# Patient Record
Sex: FEMALE | Race: WHITE | NOT HISPANIC OR LATINO | URBAN - METROPOLITAN AREA
[De-identification: names, ages, dates, MRNs, and addresses within clinical notes are randomized per-mention and may not be internally consistent; named-entity substitution may affect disease eponyms.]

---

## 2020-11-13 ENCOUNTER — EMERGENCY (EMERGENCY)
Facility: HOSPITAL | Age: 75
LOS: 1 days | Discharge: HOME | End: 2020-11-13
Attending: EMERGENCY MEDICINE | Admitting: EMERGENCY MEDICINE
Payer: MEDICARE

## 2020-11-13 VITALS
RESPIRATION RATE: 18 BRPM | HEART RATE: 88 BPM | OXYGEN SATURATION: 98 % | DIASTOLIC BLOOD PRESSURE: 87 MMHG | SYSTOLIC BLOOD PRESSURE: 127 MMHG

## 2020-11-13 VITALS
SYSTOLIC BLOOD PRESSURE: 91 MMHG | RESPIRATION RATE: 24 BRPM | DIASTOLIC BLOOD PRESSURE: 62 MMHG | TEMPERATURE: 98 F | HEIGHT: 65 IN | WEIGHT: 169.98 LBS | OXYGEN SATURATION: 96 % | HEART RATE: 102 BPM

## 2020-11-13 DIAGNOSIS — R19.7 DIARRHEA, UNSPECIFIED: ICD-10-CM

## 2020-11-13 DIAGNOSIS — Z88.0 ALLERGY STATUS TO PENICILLIN: ICD-10-CM

## 2020-11-13 DIAGNOSIS — R11.10 VOMITING, UNSPECIFIED: ICD-10-CM

## 2020-11-13 DIAGNOSIS — R05 COUGH: ICD-10-CM

## 2020-11-13 LAB
ALBUMIN SERPL ELPH-MCNC: 4.2 G/DL — SIGNIFICANT CHANGE UP (ref 3.3–5)
ALP SERPL-CCNC: 72 U/L — SIGNIFICANT CHANGE UP (ref 40–120)
ALT FLD-CCNC: 32 U/L — SIGNIFICANT CHANGE UP (ref 10–45)
ANION GAP SERPL CALC-SCNC: 14 MMOL/L — SIGNIFICANT CHANGE UP (ref 5–17)
APTT BLD: 25.8 SEC — LOW (ref 27.5–35.5)
AST SERPL-CCNC: 25 U/L — SIGNIFICANT CHANGE UP (ref 10–40)
BASOPHILS # BLD AUTO: 0.01 K/UL — SIGNIFICANT CHANGE UP (ref 0–0.2)
BASOPHILS NFR BLD AUTO: 0.2 % — SIGNIFICANT CHANGE UP (ref 0–2)
BILIRUB SERPL-MCNC: 0.5 MG/DL — SIGNIFICANT CHANGE UP (ref 0.2–1.2)
BLD GP AB SCN SERPL QL: NEGATIVE — SIGNIFICANT CHANGE UP
BUN SERPL-MCNC: 11 MG/DL — SIGNIFICANT CHANGE UP (ref 7–23)
CALCIUM SERPL-MCNC: 9.5 MG/DL — SIGNIFICANT CHANGE UP (ref 8.4–10.5)
CHLORIDE SERPL-SCNC: 106 MMOL/L — SIGNIFICANT CHANGE UP (ref 96–108)
CK MB CFR SERPL CALC: 4.8 NG/ML — SIGNIFICANT CHANGE UP (ref 0–6.7)
CK SERPL-CCNC: 199 U/L — HIGH (ref 25–170)
CO2 SERPL-SCNC: 25 MMOL/L — SIGNIFICANT CHANGE UP (ref 22–31)
CREAT SERPL-MCNC: 0.8 MG/DL — SIGNIFICANT CHANGE UP (ref 0.5–1.3)
EOSINOPHIL # BLD AUTO: 0.09 K/UL — SIGNIFICANT CHANGE UP (ref 0–0.5)
EOSINOPHIL NFR BLD AUTO: 1.4 % — SIGNIFICANT CHANGE UP (ref 0–6)
GLUCOSE SERPL-MCNC: 128 MG/DL — HIGH (ref 70–99)
HCT VFR BLD CALC: 42.8 % — SIGNIFICANT CHANGE UP (ref 34.5–45)
HGB BLD-MCNC: 14.2 G/DL — SIGNIFICANT CHANGE UP (ref 11.5–15.5)
IMM GRANULOCYTES NFR BLD AUTO: 0.3 % — SIGNIFICANT CHANGE UP (ref 0–1.5)
INR BLD: 1.08 — SIGNIFICANT CHANGE UP (ref 0.88–1.16)
LACTATE SERPL-SCNC: 0.9 MMOL/L — SIGNIFICANT CHANGE UP (ref 0.5–2)
LYMPHOCYTES # BLD AUTO: 1.27 K/UL — SIGNIFICANT CHANGE UP (ref 1–3.3)
LYMPHOCYTES # BLD AUTO: 20.5 % — SIGNIFICANT CHANGE UP (ref 13–44)
MCHC RBC-ENTMCNC: 30.9 PG — SIGNIFICANT CHANGE UP (ref 27–34)
MCHC RBC-ENTMCNC: 33.2 GM/DL — SIGNIFICANT CHANGE UP (ref 32–36)
MCV RBC AUTO: 93 FL — SIGNIFICANT CHANGE UP (ref 80–100)
MONOCYTES # BLD AUTO: 0.47 K/UL — SIGNIFICANT CHANGE UP (ref 0–0.9)
MONOCYTES NFR BLD AUTO: 7.6 % — SIGNIFICANT CHANGE UP (ref 2–14)
NEUTROPHILS # BLD AUTO: 4.35 K/UL — SIGNIFICANT CHANGE UP (ref 1.8–7.4)
NEUTROPHILS NFR BLD AUTO: 70 % — SIGNIFICANT CHANGE UP (ref 43–77)
NRBC # BLD: 0 /100 WBCS — SIGNIFICANT CHANGE UP (ref 0–0)
PLATELET # BLD AUTO: 306 K/UL — SIGNIFICANT CHANGE UP (ref 150–400)
POTASSIUM SERPL-MCNC: 3.7 MMOL/L — SIGNIFICANT CHANGE UP (ref 3.5–5.3)
POTASSIUM SERPL-SCNC: 3.7 MMOL/L — SIGNIFICANT CHANGE UP (ref 3.5–5.3)
PROT SERPL-MCNC: 7.4 G/DL — SIGNIFICANT CHANGE UP (ref 6–8.3)
PROTHROM AB SERPL-ACNC: 12.9 SEC — SIGNIFICANT CHANGE UP (ref 10.6–13.6)
RBC # BLD: 4.6 M/UL — SIGNIFICANT CHANGE UP (ref 3.8–5.2)
RBC # FLD: 12.3 % — SIGNIFICANT CHANGE UP (ref 10.3–14.5)
RH IG SCN BLD-IMP: POSITIVE — SIGNIFICANT CHANGE UP
SODIUM SERPL-SCNC: 145 MMOL/L — SIGNIFICANT CHANGE UP (ref 135–145)
TROPONIN T SERPL-MCNC: <0.01 NG/ML — SIGNIFICANT CHANGE UP (ref 0–0.01)
WBC # BLD: 6.21 K/UL — SIGNIFICANT CHANGE UP (ref 3.8–10.5)
WBC # FLD AUTO: 6.21 K/UL — SIGNIFICANT CHANGE UP (ref 3.8–10.5)

## 2020-11-13 PROCEDURE — 99284 EMERGENCY DEPT VISIT MOD MDM: CPT | Mod: 25

## 2020-11-13 PROCEDURE — 71045 X-RAY EXAM CHEST 1 VIEW: CPT | Mod: 26,77

## 2020-11-13 PROCEDURE — 85610 PROTHROMBIN TIME: CPT

## 2020-11-13 PROCEDURE — 86850 RBC ANTIBODY SCREEN: CPT

## 2020-11-13 PROCEDURE — 87040 BLOOD CULTURE FOR BACTERIA: CPT

## 2020-11-13 PROCEDURE — 85730 THROMBOPLASTIN TIME PARTIAL: CPT

## 2020-11-13 PROCEDURE — 36415 COLL VENOUS BLD VENIPUNCTURE: CPT

## 2020-11-13 PROCEDURE — 85025 COMPLETE CBC W/AUTO DIFF WBC: CPT

## 2020-11-13 PROCEDURE — 84484 ASSAY OF TROPONIN QUANT: CPT

## 2020-11-13 PROCEDURE — 82553 CREATINE MB FRACTION: CPT

## 2020-11-13 PROCEDURE — 83605 ASSAY OF LACTIC ACID: CPT

## 2020-11-13 PROCEDURE — 93010 ELECTROCARDIOGRAM REPORT: CPT

## 2020-11-13 PROCEDURE — 82550 ASSAY OF CK (CPK): CPT

## 2020-11-13 PROCEDURE — 80053 COMPREHEN METABOLIC PANEL: CPT

## 2020-11-13 PROCEDURE — 99283 EMERGENCY DEPT VISIT LOW MDM: CPT

## 2020-11-13 PROCEDURE — 86901 BLOOD TYPING SEROLOGIC RH(D): CPT

## 2020-11-13 PROCEDURE — 71045 X-RAY EXAM CHEST 1 VIEW: CPT

## 2020-11-13 PROCEDURE — 93005 ELECTROCARDIOGRAM TRACING: CPT

## 2020-11-13 PROCEDURE — 71045 X-RAY EXAM CHEST 1 VIEW: CPT | Mod: 26

## 2020-11-13 PROCEDURE — 86900 BLOOD TYPING SEROLOGIC ABO: CPT

## 2020-11-13 NOTE — ED ADULT NURSE NOTE - NSIMPLEMENTINTERV_GEN_ALL_ED
Implemented All Universal Safety Interventions:  [place] to call system. Call bell, personal items and telephone within reach. Instruct patient to call for assistance. Room bathroom lighting operational. Non-slip footwear when patient is off stretcher. Physically safe environment: no spills, clutter or unnecessary equipment. Stretcher in lowest position, wheels locked, appropriate side rails in place.

## 2020-11-13 NOTE — ED PROVIDER NOTE - OBJECTIVE STATEMENT
Patient is a 75 year old female with PMH of HLD, depression, and anxiety who had a routine EGD/colonoscopy today and had an episode of vomiting during the procedure. Pt was coughing after and sent to the ED to r/o aspiration. Currently tpt is feeling anxious and has a mild non productive cough.  She denies chest pain, SOB, fever or chills, abdominal pain, and nausea.  She notes that she was in her usual state of health prior to today. Pt is a 75 year old female with PMH of HLD, depression, and anxiety who had a routine EGD/colonoscopy today and had an episode of vomiting during colonoscopy. EGD aborted. Pt was coughing afterwards and sent to the ED to r/o aspiration. Currently pt is feeling anxious and has a mild non productive cough. She denies chest pain, SOB, fever or chills, abdominal pain, and nausea. She notes that she was in her usual state of health prior to today.

## 2020-11-13 NOTE — ED PROVIDER NOTE - PATIENT PORTAL LINK FT
You can access the FollowMyHealth Patient Portal offered by [facility] by registering at the following website: [email or web address]. By joining [employer]’s FollowMyHealth portal, you will also be able to view your health information using other applications (apps) compatible with our system.

## 2020-11-13 NOTE — ED PROVIDER NOTE - PHYSICAL EXAMINATION
VITAL SIGNS: I have reviewed nursing notes and confirm.  CONSTITUTIONAL: Well-developed; well-nourished; in no acute distress.   SKIN:  warm and dry, no acute rash.   HEAD:  normocephalic, atraumatic.  EYES: EOM intact; conjunctiva and sclera clear.  ENT: No nasal discharge; airway clear.   NECK: Supple; non tender.  CARD: S1, S2 normal; no murmurs, gallops, or rubs. Regular rate and rhythm.   RESP:  Clear to auscultation b/l, no wheezes, rales or rhonchi.  ABD: Normal bowel sounds; soft; non-distended; non-tender; no guarding/ rebound.  EXT: Normal ROM. No clubbing, cyanosis or edema. 2+ pulses to b/l ue/le.  NEURO: Alert, oriented, grossly unremarkable  PSYCH: anxious appearing.

## 2020-11-13 NOTE — ED PROVIDER NOTE - CARE PROVIDER_API CALL
Bg Reaves  GASTROENTEROLOGY  [address] [place] [address], [place] [id number]  Phone: [phone]  Fax: [phone]  Follow Up Time:

## 2020-11-13 NOTE — ED PROVIDER NOTE - DIAGNOSTIC INTERPRETATION
ER Physician: June Ree  CHEST XRAY INTERPRETATION: costophrenic angles missed on cxr, lungs otherwise clear, heart shadow normal, bony structures intact

## 2020-11-13 NOTE — ED ADULT NURSE NOTE - NSSUHOSCREENINGYN_ED_ALL_ED
Pt rec'd standing in her room with right knee dressing CDI.
Yes - the patient is able to be screened

## 2020-11-13 NOTE — ED PROVIDER NOTE - NSFOLLOWUPINSTRUCTIONS_ED_ALL_ED_FT
Take clindamycin every 8 hours for the next 5 days.  Follow up with Dr. Reaves for re-evaluation.   Return to er for any new or worsening symptoms (fever, chills, shortness of breath, worsening cough, chest pain, abdominal pain...)    Cough, Adult      Coughing is a reflex that clears your throat and your airways (respiratory system). Coughing helps to heal and protect your lungs. It is normal to cough occasionally, but a cough that happens with other symptoms or lasts a long time may be a sign of a condition that needs treatment. An acute cough may only last 2–3 weeks, while a chronic cough may last 8 or more weeks.  Coughing is commonly caused by:  •Infection of the respiratory systemby viruses or bacteria.      •Breathing in substances that irritate your lungs.      •Allergies.      •Asthma.      •Mucus that runs down the back of your throat (postnasal drip).      •Smoking.      •Acid backing up from the stomach into the esophagus (gastroesophageal reflux).      •Certain medicines.      •Chronic lung problems.      •Other medical conditions such as heart failure or a blood clot in the lung (pulmonary embolism).        Follow these instructions at home:    Medicines     •Take over-the-counter and prescription medicines only as told by your health care provider.      •Talk with your health care provider before you take a cough suppressant medicine.        Lifestyle      •Avoid cigarette smoke. Do not use any products that contain nicotine or tobacco, such as cigarettes, e-cigarettes, and chewing tobacco. If you need help quitting, ask your health care provider.      •Drink enough fluid to keep your urine pale yellow.      •Avoid caffeine.      • Do not drink alcohol if your health care provider tells you not to drink.        General instructions      •Pay close attention to changes in your cough. Tell your health care provider about them.      •Always cover your mouth when you cough.      •Avoid things that make you cough, such as perfume, candles, cleaning products, or campfire or tobacco smoke.      •If the air is dry, use a cool mist vaporizer or humidifier in your bedroom or your home to help loosen secretions.      •If your cough is worse at night, try to sleep in a semi-upright position.      •Rest as needed.      •Keep all follow-up visits as told by your health care provider. This is important.        Contact a health care provider if you:    •Have new symptoms.      •Cough up pus.      •Have a cough that does not get better after 2–3 weeks or gets worse.      •Cannot control your cough with cough suppressant medicines and you are losing sleep.      •Have pain that gets worse or pain that is not helped with medicine.      •Have a fever.      •Have unexplained weight loss.      •Have night sweats.        Get help right away if:    •You cough up blood.      •You have difficulty breathing.      •Your heartbeat is very fast.      These symptoms may represent a serious problem that is an emergency. Do not wait to see if the symptoms will go away. Get medical help right away. Call your local emergency services (911 in the [place] Do not drive yourself to the hospital.       Summary    •Coughing is a reflex that clears your throat and your airways. It is normal to cough occasionally, but a cough that happens with other symptoms or lasts a long time may be a sign of a condition that needs treatment.      •Take over-the-counter and prescription medicines only as told by your health care provider.      •Always cover your mouth when you cough.      •Contact a health care provider if you have new symptoms or a cough that does not get better after 2–3 weeks or gets worse.      This information is not intended to replace advice given to you by your health care provider. Make sure you discuss any questions you have with your health care provider.

## 2020-11-13 NOTE — ED ADULT NURSE NOTE - CHIEF COMPLAINT QUOTE
Patient referred to ER from outpatient procedure. Per EMS, patient sent to r/o aspiration, patient vomited during endoscopy.

## 2020-11-13 NOTE — ED ADULT NURSE NOTE - OBJECTIVE STATEMENT
Pt reports endoscopy and colonoscopy today, was given propofol and IV started and pt vomited and possibly aspirated. Pt has cough on arrival, reports cough after attempting procedure, pt denies chest pain, sob, dizziness, nausea.

## 2020-11-13 NOTE — ED ADULT TRIAGE NOTE - CHIEF COMPLAINT QUOTE
Patient referred to ER from outpatient procedure. Per EMS, patient sent to r/o aspiration, patient vomited during endoscopy.
Patient refused

## 2020-11-13 NOTE — ED PROVIDER NOTE - CLINICAL SUMMARY MEDICAL DECISION MAKING FREE TEXT BOX
Impression: routine EGD/colonoscopy today with episode of nausea and vomiting and sent to ED to r/o aspiration. Pt is afebrile and hemodynamically stable with O2 sat 94%-95%. Will look at chest x-ray and labs, and contact Dr. Reaves. Impression: s/p routine colonoscopy today with episode of nausea and vomiting while undergoing procedure. Pt sent to ED to r/o aspiration. Pt is afebrile and hemodynamically stable with O2 sat 94%-95%. Labs reviewed and reassuring w/ normal wbc/ lactate. Unable to visualize end of costophrenic angles b/l however no obvious infiltrate. EKG non-ischemic. ED evaluation and management discussed with the patient, family and Dr. Reaves in detail.  Close PMD follow up encouraged.  Will send home w/ rx for abx x 5 days as per Dr. Reaves. Strict ED return instructions discussed in detail and patient given the opportunity to ask any questions about their discharge diagnosis and instructions. Patient verbalized understanding.

## 2020-11-18 PROBLEM — F41.8 OTHER SPECIFIED ANXIETY DISORDERS: Chronic | Status: ACTIVE | Noted: 2020-11-13

## 2020-11-18 PROBLEM — E78.5 HYPERLIPIDEMIA, UNSPECIFIED: Chronic | Status: ACTIVE | Noted: 2020-11-13

## 2020-11-18 LAB
CULTURE RESULTS: SIGNIFICANT CHANGE UP
CULTURE RESULTS: SIGNIFICANT CHANGE UP
SPECIMEN SOURCE: SIGNIFICANT CHANGE UP
SPECIMEN SOURCE: SIGNIFICANT CHANGE UP

## 2021-02-23 PROBLEM — Z00.00 ENCOUNTER FOR PREVENTIVE HEALTH EXAMINATION: Status: ACTIVE | Noted: 2021-02-23

## 2021-03-05 ENCOUNTER — APPOINTMENT (OUTPATIENT)
Age: 76
End: 2021-03-05

## 2021-10-21 ENCOUNTER — APPOINTMENT (OUTPATIENT)
Dept: ORTHOPEDIC SURGERY | Facility: CLINIC | Age: 76
End: 2021-10-21
Payer: MEDICARE

## 2021-10-21 VITALS — BODY MASS INDEX: 27.32 KG/M2 | WEIGHT: 170 LBS | HEIGHT: 66 IN

## 2021-10-21 DIAGNOSIS — Z78.9 OTHER SPECIFIED HEALTH STATUS: ICD-10-CM

## 2021-10-21 DIAGNOSIS — M25.562 PAIN IN RIGHT KNEE: ICD-10-CM

## 2021-10-21 DIAGNOSIS — F12.90 CANNABIS USE, UNSPECIFIED, UNCOMPLICATED: ICD-10-CM

## 2021-10-21 DIAGNOSIS — Z60.2 PROBLEMS RELATED TO LIVING ALONE: ICD-10-CM

## 2021-10-21 DIAGNOSIS — S83.207A UNSPECIFIED TEAR OF UNSPECIFIED MENISCUS, CURRENT INJURY, LEFT KNEE, INITIAL ENCOUNTER: ICD-10-CM

## 2021-10-21 DIAGNOSIS — M25.561 PAIN IN RIGHT KNEE: ICD-10-CM

## 2021-10-21 DIAGNOSIS — Z82.61 FAMILY HISTORY OF ARTHRITIS: ICD-10-CM

## 2021-10-21 DIAGNOSIS — S83.241A OTHER TEAR OF MEDIAL MENISCUS, CURRENT INJURY, RIGHT KNEE, INITIAL ENCOUNTER: ICD-10-CM

## 2021-10-21 PROCEDURE — 20610 DRAIN/INJ JOINT/BURSA W/O US: CPT | Mod: 50

## 2021-10-21 PROCEDURE — 73564 X-RAY EXAM KNEE 4 OR MORE: CPT | Mod: 50

## 2021-10-21 PROCEDURE — 99204 OFFICE O/P NEW MOD 45 MIN: CPT | Mod: 25

## 2021-10-21 RX ORDER — ATORVASTATIN CALCIUM 20 MG/1
20 TABLET, FILM COATED ORAL
Refills: 0 | Status: ACTIVE | COMMUNITY

## 2021-10-21 RX ORDER — METFORMIN HYDROCHLORIDE 500 MG/1
500 TABLET, COATED ORAL
Refills: 0 | Status: ACTIVE | COMMUNITY

## 2021-10-21 SDOH — SOCIAL STABILITY - SOCIAL INSECURITY: PROBLEMS RELATED TO LIVING ALONE: Z60.2

## 2021-10-21 NOTE — DISCUSSION/SUMMARY
[de-identified] : 76 year old retired female active with gym and Pilates with recent fall and likely exacerbation of mild DJD versus degenerative meniscus tear of right knee. However even prior to the fall she had significant right knee ache and occasionally swelling. Has previous MRI of left knee showing lateral meniscus anterior horn tear which remains minimally symptomatic. \par Plan \par cortisone injections today\par We had a long discussion today about the risks and benefits of various Orthobiologic injection procedures specifically in her case prp injections. \par We will consider doing those if symptoms persist in November first pending MRI evlauation. \par \par

## 2021-10-21 NOTE — PHYSICAL EXAM
[de-identified] : Patient is well nourished, well-developed, in no acute distress, with appropriate mood and affect. The patient is oriented to time, place, and person. Gait evaluation does not reveal a limp. The skin examination does not reveal obvious lesions, lacerations, or ecchymosis.\par \par Right knee: \par Inspection: No obvious deformities of the knees on inspection. No effusion in the right knee. Skin intact without overlying skin discoloration or ecchymosis. \par Palpation: Positive tenderness to palpation of the medial joint line. No tenderness to palpation of the lateral joint line. No erythema/warmth. \par ROM:  0-125 with crepitus. \par Strength:  Quads 4+/5. \par Special Testing: \par Patellofemoral Crepitus positive\par Lachman negative. Anterior drawer test negative. Posterior drawer test negative \par Valgus stress testing at 0 and 30 degrees negative. Varus stress testing at 0 and 30 degrees negative \par Dennise’s test negative. Rocky test negative  \par \par Left knee: \par Inspection: No obvious deformities of the knees on inspection. No effusion in the left knee. Skin intact without overlying skin discoloration or ecchymosis. \par Palpation: Tenderness to palpation of the medial and lateral joint line. No erythema/warmth. \par ROM:  0-130 with crepitus. \par Strength: Quads 5/5. \par Special Testing: \par Patellofemoral Crepitus negative \par Lachman negative. Anterior drawer test negative. Posterior drawer test negative \par Valgus stress testing at 0 and 30 degrees negative. Varus stress testing at 0 and 30 degrees negative \par Dennise’s test positive. Rocky test negative  \par \par Sensation is intact to light touch L2-S1 bilaterally. \par DP/PT are 2+ with no swelling bilaterally.\par  [de-identified] : Standard 4-view radiographs of the bilateral knees obtained today were reviewed today. Show No fracture or dislocation or acute changes . KL 2 . No major joint space narrowing N\par \par \par

## 2021-10-21 NOTE — HISTORY OF PRESENT ILLNESS
[de-identified] : 76 year old female presents today for initial visit for bilateral right greater than left knee pain. Patient states that her lateral knee pain beginning with her left in the summer with chronic hamstring pain after a fall 6 years ago. 2 weeks ago she took a fall which has impacted her right knee, giving her medial anterior pain with joint swelling. She's followed her care in [facility] to this point with physical therapy which she states hasn’t been helpful. Denies locking or instability. Has previous MRI of left knee showing degenerative lateral meniscus tear.

## 2021-11-16 ENCOUNTER — APPOINTMENT (OUTPATIENT)
Dept: ORTHOPEDIC SURGERY | Facility: CLINIC | Age: 76
End: 2021-11-16

## 2021-11-17 NOTE — PROCEDURE
[de-identified] : The right knee was prepped with alchohol and ethyl chloride was used to numb the skin. 1.5 cc of xylocaine 1% was placed for local anaesthesia. An injection durolane (60 mg/3 ml) was then given without complication into the  joint. Patient instructed that there may be an inflammatory flare for 24 hrs , to use ice or advil if needed. \par \par Lot number: [id number]\par Exp: 3-\par \par The left knee was prepped with alchohol and ethyl chloride was used to numb the skin. 1.5 cc of xylocaine 1% was placed for local anaesthesia. An injection durolane (60 mg/3 ml) was then given without complication into the  joint. Patient instructed that there may be an inflammatory flare for 24 hrs , to use ice or advil if needed. \par \par Lot number: [id number]\par Exp: 3-\par \par \par Plan:\par - f/u in 4 weeks for tele-visit

## 2021-11-19 RX ORDER — MELOXICAM 15 MG/1
15 TABLET ORAL DAILY
Qty: 30 | Refills: 2 | Status: COMPLETED | COMMUNITY
Start: 2021-11-19 | End: 2022-02-17

## 2021-11-24 ENCOUNTER — APPOINTMENT (OUTPATIENT)
Dept: ORTHOPEDIC SURGERY | Facility: CLINIC | Age: 76
End: 2021-11-24
Payer: MEDICARE

## 2021-11-24 DIAGNOSIS — S76.311A STRAIN OF MUSCLE, FASCIA AND TENDON OF THE POSTERIOR MUSCLE GROUP AT THIGH LEVEL, RIGHT THIGH, INITIAL ENCOUNTER: ICD-10-CM

## 2021-11-24 PROCEDURE — 99213 OFFICE O/P EST LOW 20 MIN: CPT

## 2021-11-24 NOTE — DISCUSSION/SUMMARY
[de-identified] : 75 y/o year old retired female active with gym and Pilates with bilateral meniscus tears and mild DJD with new right hamstring strain vs. L5-S1 radiculopathy. I see no evidence that she had any adverse reaction to her Durolane injection on 11/16/21 and she is now completely resolved and pain free. Her exam is now however benign. 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 Her MRI of her right knee from [facility] on 10/25/21 shows posterior horn tear of medial meniscus with root tear  and complex tearing of the lateral meniscus, although her right knee is currently pain free with no joint tenderness. I don't believe she needs surgical intervention at this time given her exam and excellent response so far to conservative care. \par \par Plan: \par - Continue PT while in [place]. Include posterior chain stretching and modalities PRN. \par - F/U in May when she returns form [place]

## 2021-11-24 NOTE — PHYSICAL EXAM
[de-identified] : Patient is well nourished, well-developed, in no acute distress, with appropriate mood and affect. The patient is oriented to time, place, and person. Gait evaluation does not reveal a limp. The skin examination does not reveal obvious lesions, lacerations, or ecchymosis.\par \par Right Knee: No effusion. No erythema or warmth. No joint tenderness. AROM 0-130 \par Right lower extremity: No ttp of hamstring or palpable defect. Positive hamstring and gastroc soleus tightness.  [de-identified] : MRI from 10/25/21 reviewed. Shows: \par 1. Contusion or stress reaction within the anterior central proximal tibia.\par 2.  Oblique undersurface tear of the posterior horn of the medial meniscus. Apical radial tear of the posterior root. Reactive marrow edema at the posterior root attachment.\par 3.  Complex tearing of the body and anterior horn of the lateral meniscus. The body is extruded.\par 4.  Mild medial, mild to moderate lateral, and mild to moderate patellofemoral compartment arthrosis.\par 5.  Findings that can be associated with patellar maltracking.\par 6.  Small knee joint effusion with mild to moderate synovitis decompressing into a moderate-sized leaking septated popliteal cyst. Mildly thickened suprapatellar and medial parapatellar plicae.\par 7.  Mild to moderate atrophy of the medial gastrocnemius muscle.

## 2021-11-24 NOTE — HISTORY OF PRESENT ILLNESS
[de-identified] : 77 y/o female presents today for follow up for right posterior thigh pain s/p her bilateral Durolane injection on 11/16/21. Patient reports that after her injection the next morning she experienced severe right posterior thigh pain, worse with sitting directly on her thigh. She called our office do to her severe pain and was ordered for Mobic which she took for 5 days and completely resolved her pain. She has stopped however as it bothered her stomach. She went to PT where they told her that her posterior chain muscles on her right side were in spasm and extremely tight. 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 She currently denies any thigh or knee pain. She is also here to review her MRI from [facility] on 10/25/21.

## 2021-12-16 ENCOUNTER — APPOINTMENT (OUTPATIENT)
Dept: ORTHOPEDIC SURGERY | Facility: CLINIC | Age: 76
End: 2021-12-16
Payer: MEDICARE

## 2021-12-16 DIAGNOSIS — S83.281D OTHER TEAR OF LATERAL MENISCUS, CURRENT INJURY, RIGHT KNEE, SUBSEQUENT ENCOUNTER: ICD-10-CM

## 2021-12-16 PROCEDURE — 99442: CPT | Mod: 95

## 2022-05-04 ENCOUNTER — APPOINTMENT (OUTPATIENT)
Dept: ORTHOPEDIC SURGERY | Facility: CLINIC | Age: 77
End: 2022-05-04
Payer: MEDICARE

## 2022-05-04 PROCEDURE — 99213 OFFICE O/P EST LOW 20 MIN: CPT

## 2022-05-04 NOTE — PHYSICAL EXAM
[de-identified] : Right knee, minimal lateral jt tenderness negative Dennise and guero neg lachman \par Quads 5/5

## 2022-05-04 NOTE — HISTORY OF PRESENT ILLNESS
[de-identified] : 76 y/o female presents today for follow up for bilateral knee pain. Left  Patient states she has been doing really well until about 3 weeks ago she started feeling pain lateral for the first time again. He hamstring issue was fully resolved with PT and her right knee remains painless.

## 2022-05-04 NOTE — DISCUSSION/SUMMARY
[de-identified] : PAtient did well Post HA injection 7 months ago and PT. Now with early minor lateral joint ache. recurring. \par Plan SHe would like to wait a week till after her Breast biopsy and pathology report./  SHe will return in 8 days for HA injection (Monovisc) \par

## 2022-06-15 ENCOUNTER — APPOINTMENT (OUTPATIENT)
Dept: ORTHOPEDIC SURGERY | Facility: CLINIC | Age: 77
End: 2022-06-15

## 2022-10-19 ENCOUNTER — APPOINTMENT (OUTPATIENT)
Dept: ORTHOPEDIC SURGERY | Facility: CLINIC | Age: 77
End: 2022-10-19

## 2023-04-19 ENCOUNTER — APPOINTMENT (OUTPATIENT)
Dept: ORTHOPEDIC SURGERY | Facility: CLINIC | Age: 78
End: 2023-04-19
Payer: MEDICARE

## 2023-04-19 PROCEDURE — 20610 DRAIN/INJ JOINT/BURSA W/O US: CPT | Mod: LT

## 2023-04-19 NOTE — PROCEDURE
[de-identified] : The right knee was prepped with alcohol and ethyl chloride was used to numb the skin. 1.5 cc of xylocaine 1% was placed for local anaesthesia. An injection durolane (60 mg/3 ml) was then given without complication into the  joint. Patient instructed that there may be an inflammatory flare for 24 hrs , to use ice or Advil if needed. \par \par Lot #[id number]\par Exp:2025-09-30\par \par The left knee was prepped with alcohol and ethyl chloride was used to numb the skin. 1.5 cc of xylocaine 1% was placed for local anaesthesia. An injection of durolane, was then given without complication into the  joint. Patient instructed that there may be an inflammatory flare for 24 hrs , to use ice or Advil if needed.\par \par Lot #[id number]\par Exp:2025-09-30\par \par Pt tolerated procedure well.\par F/u in 6 wks\par new PT given

## 2023-05-01 ENCOUNTER — NON-APPOINTMENT (OUTPATIENT)
Age: 78
End: 2023-05-01

## 2023-05-18 ENCOUNTER — APPOINTMENT (OUTPATIENT)
Dept: ORTHOPEDIC SURGERY | Facility: CLINIC | Age: 78
End: 2023-05-18
Payer: MEDICARE

## 2023-05-18 PROCEDURE — 99213 OFFICE O/P EST LOW 20 MIN: CPT

## 2023-05-18 RX ORDER — DICLOFENAC POTASSIUM 50 MG/1
50 TABLET, COATED ORAL TWICE DAILY
Qty: 60 | Refills: 2 | Status: ACTIVE | COMMUNITY
Start: 2023-05-18 | End: 1900-01-01

## 2023-05-18 NOTE — DISCUSSION/SUMMARY
[de-identified] : DYLON GILLIAM 78 year female Followup for bilateral knee pain. She had left calf pain following her HA injections that lasted 2 weeks but has now resolved. She got a doppler and r/o DVT of left calf. But now has left foot pain and back pain. She is seeing Dr. Murphy to address spinal stenosis that could be the cause of much of her lower extremity pain. Her left knee pain is from exacerbation of her OA.\par \par Plan\par HEP\par Diclofenac\par f/u 3 months

## 2023-05-18 NOTE — HISTORY OF PRESENT ILLNESS
[de-identified] : DYLON GILLIAM 78 year female Followup for bilateral knee pain. Pt states she feel tightness in the posterior of left knee and the lateral side of her right knee. She had left calf pain following her HA injections that lasted 2 weeks but has now resolved. She got a doppler and r/o DVT of left calf. But now has left foot pain and back pain. She is seeing Dr. Murphy to address spinal stenosis that could be the cause of much of her lower extremity pain. \par

## 2023-06-19 ENCOUNTER — APPOINTMENT (OUTPATIENT)
Dept: ORTHOPEDIC SURGERY | Facility: CLINIC | Age: 78
End: 2023-06-19

## 2023-06-19 ENCOUNTER — APPOINTMENT (OUTPATIENT)
Dept: ORTHOPEDIC SURGERY | Facility: CLINIC | Age: 78
End: 2023-06-19
Payer: MEDICARE

## 2023-06-19 DIAGNOSIS — M54.16 RADICULOPATHY, LUMBAR REGION: ICD-10-CM

## 2023-06-19 PROCEDURE — 99213 OFFICE O/P EST LOW 20 MIN: CPT | Mod: 25

## 2023-06-19 PROCEDURE — 20610 DRAIN/INJ JOINT/BURSA W/O US: CPT | Mod: 50

## 2023-06-19 NOTE — PROCEDURE
[de-identified] : The left knee was prepped with alchohol and ethyl chloride was used to numb the skin. A 3 cc injection with 1 cc xylocaine, 1cc sensoricaine and 1 cc kenalog, was given without complication into the knee joint. Patient instructed that there may be an inflammatory flare for 24 hrs , to use ice or advil if needed\par The right knee was prepped with alchohol and ethyl chloride was used to numb the skin. A 3 cc injection with 1 cc xylocaine, 1cc sensoricaine and 1 cc kenalog , was given without complication into the knee joint. Patient instructed that there may be an inflammatory flare for 24 hrs , to use ice or advil if needed\par \par

## 2023-06-19 NOTE — HISTORY OF PRESENT ILLNESS
[de-identified] : DYLON GILLIAM 78 year female Followup for bilateral knee. Pt states her lateral calf and lateral knee knee area still hurts  even after going to physical therapy twice a week for her spinal stenosis. \par \par

## 2023-06-19 NOTE — DISCUSSION/SUMMARY
[de-identified] : Ruby clearly has bilateral age related OA and yet the pain seems atypical to me as it starts below the knee and heads to lateral calf and I still some of it is the lumbar radiculopathy being treated by her spine specialist \par I have given her cortisone in each knee to day laterally to see if that helps but suggest she continue her spine PT\par ANd f/u prn \par

## 2023-09-13 ENCOUNTER — APPOINTMENT (OUTPATIENT)
Dept: ORTHOPEDIC SURGERY | Facility: CLINIC | Age: 78
End: 2023-09-13
Payer: MEDICARE

## 2023-09-13 DIAGNOSIS — S83.282A OTHER TEAR OF LATERAL MENISCUS, CURRENT INJURY, LEFT KNEE, INITIAL ENCOUNTER: ICD-10-CM

## 2023-09-13 PROCEDURE — 20610 DRAIN/INJ JOINT/BURSA W/O US: CPT | Mod: LT

## 2023-09-13 PROCEDURE — 99213 OFFICE O/P EST LOW 20 MIN: CPT | Mod: 25

## 2023-10-03 ENCOUNTER — APPOINTMENT (OUTPATIENT)
Dept: ORTHOPEDIC SURGERY | Facility: CLINIC | Age: 78
End: 2023-10-03
Payer: MEDICARE

## 2023-10-03 VITALS
BODY MASS INDEX: 27.32 KG/M2 | DIASTOLIC BLOOD PRESSURE: 75 MMHG | TEMPERATURE: 98.4 F | WEIGHT: 170 LBS | SYSTOLIC BLOOD PRESSURE: 114 MMHG | HEIGHT: 66 IN | OXYGEN SATURATION: 97 % | HEART RATE: 112 BPM

## 2023-10-03 PROCEDURE — 20610 DRAIN/INJ JOINT/BURSA W/O US: CPT | Mod: LT

## 2023-10-03 PROCEDURE — 99214 OFFICE O/P EST MOD 30 MIN: CPT | Mod: 25

## 2023-10-03 RX ORDER — ATORVASTATIN CALCIUM 80 MG/1
TABLET, FILM COATED ORAL
Refills: 0 | Status: DISCONTINUED | COMMUNITY
End: 2023-10-03

## 2023-10-05 RX ORDER — SEMAGLUTIDE 1.34 MG/ML
4 INJECTION, SOLUTION SUBCUTANEOUS
Qty: 3 | Refills: 0 | Status: ACTIVE | COMMUNITY
Start: 2023-07-26

## 2023-10-05 RX ORDER — BUPROPION HYDROCHLORIDE 150 MG/1
150 TABLET, EXTENDED RELEASE ORAL
Qty: 60 | Refills: 0 | Status: ACTIVE | COMMUNITY
Start: 2023-08-28

## 2023-11-07 ENCOUNTER — APPOINTMENT (OUTPATIENT)
Dept: ORTHOPEDIC SURGERY | Facility: CLINIC | Age: 78
End: 2023-11-07
Payer: MEDICARE

## 2023-11-07 PROCEDURE — 99213 OFFICE O/P EST LOW 20 MIN: CPT

## 2023-11-30 ENCOUNTER — APPOINTMENT (OUTPATIENT)
Dept: ORTHOPEDIC SURGERY | Facility: CLINIC | Age: 78
End: 2023-11-30
Payer: MEDICARE

## 2023-11-30 PROCEDURE — 99213 OFFICE O/P EST LOW 20 MIN: CPT | Mod: 25

## 2023-11-30 PROCEDURE — 20610 DRAIN/INJ JOINT/BURSA W/O US: CPT | Mod: LT

## 2023-12-24 NOTE — PROCEDURE
[de-identified] : The right knee was prepped with alchohol and ethyl chloride was used to numb the skin. A 3 cc injection with 1 cc xylocaine, 1cc sensoricaine and 1 cc depomedrol , was given without complication into the knee joint. Patient instructed that there may be an inflammatory flare for 24 hrs , to use ice or advil if needed\par \par The left knee was prepped with alchohol and ethyl chloride was used to numb the skin. A 3 cc injection with 1 cc xylocaine, 1cc sensoricaine and 1 cc depomedrol , was given without complication into the knee joint. Patient instructed that there may be an inflammatory flare for 24 hrs , to use ice or advil if needed\par  Opt out

## 2024-02-13 ENCOUNTER — APPOINTMENT (OUTPATIENT)
Dept: ORTHOPEDIC SURGERY | Facility: CLINIC | Age: 79
End: 2024-02-13

## 2024-04-10 ENCOUNTER — NON-APPOINTMENT (OUTPATIENT)
Age: 79
End: 2024-04-10

## 2024-04-11 DIAGNOSIS — Z85.3 PERSONAL HISTORY OF MALIGNANT NEOPLASM OF BREAST: ICD-10-CM

## 2024-04-16 ENCOUNTER — RESULT REVIEW (OUTPATIENT)
Age: 79
End: 2024-04-16

## 2024-04-16 ENCOUNTER — APPOINTMENT (OUTPATIENT)
Dept: ORTHOPEDIC SURGERY | Facility: CLINIC | Age: 79
End: 2024-04-16
Payer: MEDICARE

## 2024-04-16 ENCOUNTER — OUTPATIENT (OUTPATIENT)
Dept: OUTPATIENT SERVICES | Facility: HOSPITAL | Age: 79
LOS: 1 days | End: 2024-04-16
Payer: MEDICARE

## 2024-04-16 VITALS
WEIGHT: 170 LBS | HEIGHT: 66 IN | TEMPERATURE: 97.9 F | OXYGEN SATURATION: 98 % | SYSTOLIC BLOOD PRESSURE: 124 MMHG | DIASTOLIC BLOOD PRESSURE: 78 MMHG | HEART RATE: 94 BPM | BODY MASS INDEX: 27.32 KG/M2

## 2024-04-16 PROCEDURE — 20610 DRAIN/INJ JOINT/BURSA W/O US: CPT | Mod: 50

## 2024-04-16 PROCEDURE — 99213 OFFICE O/P EST LOW 20 MIN: CPT | Mod: 25

## 2024-04-16 PROCEDURE — 73564 X-RAY EXAM KNEE 4 OR MORE: CPT | Mod: 26,50

## 2024-04-16 PROCEDURE — 73564 X-RAY EXAM KNEE 4 OR MORE: CPT

## 2024-04-17 NOTE — DISCUSSION/SUMMARY
[de-identified] : Ruby has symptomatic DJD In both knees. She has had success with HA in the past and received a new round of injections today. She will slowly advance her activities as tolerated. She will consider knee replacement in the future. She will call if any issues arise

## 2024-04-17 NOTE — HISTORY OF PRESENT ILLNESS
[de-identified] : Ruby returns today for evalaution of both knees. SHe has a h,o DJD and has had some success with HA and cortisone in the past. She reports a recent increase in bilateral knee pain and stiffness. SHe has difficulty walking and pain with stairs. She denies any locking or buckling

## 2024-04-17 NOTE — END OF VISIT
[FreeTextEntry3] : All medical record entries made by FE Castillo, acting as a scribe for this encounter under the direction of Devin Stallings MD . I have reviewed the chart and agree that the record accurately reflects my personal performance of the history, physical exam, assessment and plan. I have also personally directed, reviewed, and agreed with the chart.

## 2024-04-17 NOTE — REVIEW OF SYSTEMS
[Joint Pain] : joint pain [Joint Stiffness] : joint stiffness Patient/Caregiver provided printed discharge information.

## 2024-04-17 NOTE — PHYSICAL EXAM
[de-identified] : The patient is a well developed, well nourished female in no apparent distress. She is alert and oriented X 3 with a pleasant mood and appropriate affect.     On physical examination of the left knee, her ROM is 2-120 degrees. The patient walks with a normal gait and stands in neutral alignment. There is trace effusion. No warmth or erythema is noted. The patella is non tender to palpation medially or laterally. There is no crepitus noted. The apprehension and grind tests are negative. The extensor mechanism is intact. There is lateral joint line tenderness. The Dennise sign is absent. The Lachman and pivot shift tests are negative. There is no varus or valgus laxity at 0 or 30 degrees. No posterolateral or anteromedial laxity is noted. No masses are palpable. No other soft tissue or bony tenderness is noted. Quadriceps weakness is noted. Neurovascular function is intact.     On physical examination of the right  knee, her ROM is 2-120 degrees. The patient walks with a normal gait and stands in neutral alignment. There is trace effusion. No warmth or erythema is noted. The patella is non tender to palpation medially or laterally. There is no crepitus noted. The apprehension and grind tests are negative. The extensor mechanism is intact. There is lateral joint line tenderness. The Dennise sign is absent. The Lachman and pivot shift tests are negative. There is no varus or valgus laxity at 0 or 30 degrees. No posterolateral or anteromedial laxity is noted. No masses are palpable. No other soft tissue or bony tenderness is noted. Quadriceps weakness is noted. Neurovascular function is intact.   [de-identified] : Radiographs of both knees show moderate lateral compartment DJD

## 2024-04-17 NOTE — PROCEDURE
[de-identified] : Under strict sterile technique, both knees were prepped with Chloraprep Using the superolateral approach, with the patient supine, a 4mL injection of Monovisc was administered intra-articularly into each knee. The patient tolerated the procedure well. The patient was instructed to avoid vigorous exercise for 48 hours and will apply ice to the knee for 20 minutes 2-3 times per day if discomfort occurs. Patient will return on an as needed basis. The patient will call if any questions or problems should arise.      MonoVisc injection - Bilateral knee joints Lot #; [id number] Exp: 04- Man: [employer] NDC: [id number]

## 2024-04-19 NOTE — ED ADULT TRIAGE NOTE - BP NONINVASIVE SYSTOLIC (MM HG)
T(C): 36 (04-19-24 @ 10:06), Max: 36 (04-19-24 @ 10:06)  HR: 65 (04-19-24 @ 11:23) (65 - 75)  BP: 131/79 (04-19-24 @ 11:23) (131/79 - 177/133)  RR: 22 (04-19-24 @ 11:23) (18 - 22)  SpO2: 97% (04-19-24 @ 11:23) (97% - 97%)  Wt(kg): --Vital Signs Last 24 Hrs  T(C): 36 (19 Apr 2024 10:06), Max: 36 (19 Apr 2024 10:06)  T(F): 96.8 (19 Apr 2024 10:06), Max: 96.8 (19 Apr 2024 10:06)  HR: 65 (19 Apr 2024 11:23) (65 - 75)  BP: 131/79 (19 Apr 2024 11:23) (131/79 - 177/133)  BP(mean): --  RR: 22 (19 Apr 2024 11:23) (18 - 22)  SpO2: 97% (19 Apr 2024 11:23) (97% - 97%)    Parameters below as of 19 Apr 2024 11:23  Patient On (Oxygen Delivery Method): room air        PHYSICAL EXAM:  GENERAL: NAD  HEENT: NCAT  NECK: Supple, No JVD  CHEST/LUNG: Clear to percussion bilaterally; No rales, rhonchi, wheezing  HEART: Regular rate and rhythm; No murmurs, no pain on palpation of chest wall   ABDOMEN: Soft, Nontender, Nondistended; Bowel sounds present  MUSCULOSKELETAL/EXTREMITIES:  2+ Peripheral Pulses, No LE edema  SKIN: No rashes or lesions  PSYCH: Appropriate affect  NEURO: AAO x 3, nonfocal 91

## 2024-04-30 ENCOUNTER — TRANSCRIPTION ENCOUNTER (OUTPATIENT)
Age: 79
End: 2024-04-30

## 2024-05-21 ENCOUNTER — APPOINTMENT (OUTPATIENT)
Dept: ORTHOPEDIC SURGERY | Facility: CLINIC | Age: 79
End: 2024-05-21
Payer: MEDICARE

## 2024-05-21 VITALS
BODY MASS INDEX: 27.32 KG/M2 | DIASTOLIC BLOOD PRESSURE: 74 MMHG | OXYGEN SATURATION: 96 % | TEMPERATURE: 98 F | SYSTOLIC BLOOD PRESSURE: 117 MMHG | HEART RATE: 84 BPM | HEIGHT: 66 IN | WEIGHT: 170 LBS

## 2024-05-21 DIAGNOSIS — M17.12 UNILATERAL PRIMARY OSTEOARTHRITIS, LEFT KNEE: ICD-10-CM

## 2024-05-21 PROCEDURE — 99213 OFFICE O/P EST LOW 20 MIN: CPT | Mod: 25

## 2024-05-21 PROCEDURE — 20610 DRAIN/INJ JOINT/BURSA W/O US: CPT | Mod: LT

## 2024-05-22 PROBLEM — M17.12 PRIMARY OSTEOARTHRITIS OF LEFT KNEE: Status: ACTIVE | Noted: 2021-10-21

## 2024-05-22 RX ORDER — DICLOFENAC SODIUM 50 MG/1
50 TABLET, DELAYED RELEASE ORAL
Qty: 1 | Refills: 2 | Status: ACTIVE | COMMUNITY
Start: 2024-05-22 | End: 1900-01-01

## 2024-05-28 ENCOUNTER — APPOINTMENT (OUTPATIENT)
Dept: ORTHOPEDIC SURGERY | Facility: CLINIC | Age: 79
End: 2024-05-28
Payer: MEDICARE

## 2024-05-28 VITALS
DIASTOLIC BLOOD PRESSURE: 84 MMHG | SYSTOLIC BLOOD PRESSURE: 136 MMHG | HEART RATE: 110 BPM | WEIGHT: 170 LBS | OXYGEN SATURATION: 97 % | HEIGHT: 66 IN | BODY MASS INDEX: 27.32 KG/M2

## 2024-05-28 DIAGNOSIS — M17.11 UNILATERAL PRIMARY OSTEOARTHRITIS, RIGHT KNEE: ICD-10-CM

## 2024-05-28 PROCEDURE — 20610 DRAIN/INJ JOINT/BURSA W/O US: CPT | Mod: RT

## 2024-05-28 PROCEDURE — 99213 OFFICE O/P EST LOW 20 MIN: CPT | Mod: 25

## 2024-05-28 NOTE — PHYSICAL EXAM
[de-identified] : The patient is a well developed, well nourished female in no apparent distress. She is alert and oriented X 3 with a pleasant mood and appropriate affect.     On physical examination of the left knee, her ROM is 2-120 degrees. The patient walks with a normal gait and stands in neutral alignment. There is trace effusion. No warmth or erythema is noted. The patella is non tender to palpation medially or laterally. There is no crepitus noted. The apprehension and grind tests are negative. The extensor mechanism is intact. There is lateral joint line tenderness. The Dennise sign is absent. The Lachman and pivot shift tests are negative. There is no varus or valgus laxity at 0 or 30 degrees. No posterolateral or anteromedial laxity is noted. No masses are palpable. No other soft tissue or bony tenderness is noted. Quadriceps weakness is noted. Neurovascular function is intact.

## 2024-05-28 NOTE — PROCEDURE
[de-identified] : Under strict sterile technique, theleft knee was prepped with Chloraprep. Using the superolateral approach, with the patient supine, 30mls of clear yellow fluid was aspirated from the knee. Then, 1ml of Kenalog was mixed with 5mls of 1% Lidocaine and 5mLs of 0.5% Marcaine, and was injected intraarticularly. The patient tolerated the procedure well. The patient was instructed to avoid vigorous exercise for 24 hours and will apply ice to the knee for 20 minutes 2-3 times per day if discomfort occurs. Patient will return on an as needed basis. The patient will call if any questions or problems should arise

## 2024-05-28 NOTE — DISCUSSION/SUMMARY
[de-identified] : Ruby has symptomatic DJD in her left knee. She had her knee aspirated adn received a cortisone injection today. She will return next week for a cortisone injection in her right knee. she understands that she will ultimately require knee replacement in the future. all questions were answered. [facility] will call if any issues arise

## 2024-05-28 NOTE — HISTORY OF PRESENT ILLNESS
[de-identified] : Ruby returns today for evalaution of her left knee. She did not have any relief from the Monovisc injections 5 weeks ago. Both knees have been stiff and painful but her left knee has been swollen. She has started taking Voltaren 50mgbid without much improvement. She has limited walking tolerance and pain with stairs.she has been unable to exercise and. She has been resting and icing without last relief.

## 2024-05-30 NOTE — PROCEDURE
[de-identified] : Under strict sterile technique, the right knee was prepped with Chloraprep. Using the superolateral approach, with the patient supine,1ml of Kenalog was mixed with 5mls of 1% Lidocaine and 5mLs of 0.5% Marcaine, and was injected intraarticularly. The patient tolerated the procedure well. The patient was instructed to avoid vigorous exercise for 24 hours and will apply ice to the knee for 20 minutes 2-3 times per day if discomfort occurs. Patient will return on an as needed basis. The patient will call if any questions or problems should arise   Injection was performed by Devin Stallings MD

## 2024-05-30 NOTE — END OF VISIT
[FreeTextEntry3] : All medical record entries made by FE Castillo, acting as a scribe for this encounter under the direction of Devin Stallings MD . I have reviewed the chart and agree that the record accurately reflects my personal performance of the history, physical exam, assessment and plan. I have also personally directed, reviewed, and agreed with the chart. Injection was performed by Devin Stallings MD

## 2024-05-30 NOTE — HISTORY OF PRESENT ILLNESS
[de-identified] : Ruby returns today for evaluation of her right knee.  She has limited walking tolerance and pain with stairs.she has been unable to exercise and. She has been resting and icing without last relief. She had marked relief from the cortisone injection in her left knee last week. her left knee is less stiff and swollen. She denies any locking or buckling.

## 2024-05-30 NOTE — PHYSICAL EXAM
[de-identified] : The patient is a well developed, well nourished female in no apparent distress. She is alert and oriented X 3 with a pleasant mood and appropriate affect.     On physical examination of the right knee, her ROM is 0-120 degrees. The patient walks with a normal gait and stands in neutral alignment. There is trace effusion. No warmth or erythema is noted. The patella is non tender to palpation medially or laterally. There is no crepitus noted. The apprehension and grind tests are negative. The extensor mechanism is intact. There is lateral joint line tenderness. The Dennise sign is absent. The Lachman and pivot shift tests are negative. There is no varus or valgus laxity at 0 or 30 degrees. No posterolateral or anteromedial laxity is noted. No masses are palpable. No other soft tissue or bony tenderness is noted. Quadriceps weakness is noted. Neurovascular function is intact.

## 2024-05-30 NOTE — DISCUSSION/SUMMARY
[de-identified] : Ruby has symptomatic DJD in her right knee. She received a cortisone injection today. she will increase her activities as tolerated. she understands that she will ultimately require knee replacement in the future. all questions were answered. She will call if any issues arise

## 2024-09-24 ENCOUNTER — APPOINTMENT (OUTPATIENT)
Dept: ORTHOPEDIC SURGERY | Facility: CLINIC | Age: 79
End: 2024-09-24
Payer: MEDICARE

## 2024-09-24 VITALS
TEMPERATURE: 97.7 F | HEART RATE: 89 BPM | WEIGHT: 170 LBS | HEIGHT: 66 IN | OXYGEN SATURATION: 95 % | BODY MASS INDEX: 27.32 KG/M2

## 2024-09-24 DIAGNOSIS — M17.12 UNILATERAL PRIMARY OSTEOARTHRITIS, LEFT KNEE: ICD-10-CM

## 2024-09-24 PROCEDURE — 99213 OFFICE O/P EST LOW 20 MIN: CPT

## 2024-09-25 NOTE — PHYSICAL EXAM
[de-identified] : The patient is a well developed, well nourished female in no apparent distress. She is alert and oriented X 3 with a pleasant mood and appropriate affect.     On physical examination of the right knee, her ROM is 0-120 degrees. The patient walks with a normal gait and stands in neutral alignment. There is trace effusion. No warmth or erythema is noted. The patella is non tender to palpation medially or laterally. There is no crepitus noted. The apprehension and grind tests are negative. The extensor mechanism is intact. There is lateral joint line tenderness. The Dennise sign is absent. The Lachman and pivot shift tests are negative. There is no varus or valgus laxity at 0 or 30 degrees. No posterolateral or anteromedial laxity is noted. No masses are palpable. No other soft tissue or bony tenderness is noted. Quadriceps weakness is noted. Neurovascular function is intact.

## 2024-09-25 NOTE — END OF VISIT
[FreeTextEntry3] : .Dr Stallings has reviewed the chart and agrees that the record accurately reflects his personal performance of the history, physical exam, assessment, and plan. He personally directed, reviewed, and agreed with the chart.All medical record entries made by FE Castillo, acting as a scribe for this encounter under the direction of Devin Stallings MD

## 2024-09-25 NOTE — DISCUSSION/SUMMARY
[de-identified] : Ruby has DJD In both knees. She is minimally symptomatic at this time and will hold off on cortisone until prior to her trip to [place] for the winter  she will increase her activities as tolerated. she understands that she will ultimately require knee replacement in the future. all questions were answered. She will call if any issues arise

## 2024-09-25 NOTE — HISTORY OF PRESENT ILLNESS
[de-identified] : Ruby returns today for evaluation of both knees. She has longstanding DJD and has managed well with cortisone injections. She is heading to [place] in December and would like to discuss the timing of her next injections. She saw Dr Powell at [facility] and was told she is a candidate for knee replacement. She has little pain at this time. She denies any locking or buckling

## 2024-09-25 NOTE — PHYSICAL EXAM
[de-identified] : The patient is a well developed, well nourished female in no apparent distress. She is alert and oriented X 3 with a pleasant mood and appropriate affect.     On physical examination of the right knee, her ROM is 0-120 degrees. The patient walks with a normal gait and stands in neutral alignment. There is trace effusion. No warmth or erythema is noted. The patella is non tender to palpation medially or laterally. There is no crepitus noted. The apprehension and grind tests are negative. The extensor mechanism is intact. There is lateral joint line tenderness. The Dennise sign is absent. The Lachman and pivot shift tests are negative. There is no varus or valgus laxity at 0 or 30 degrees. No posterolateral or anteromedial laxity is noted. No masses are palpable. No other soft tissue or bony tenderness is noted. Quadriceps weakness is noted. Neurovascular function is intact.

## 2024-09-25 NOTE — DISCUSSION/SUMMARY
[de-identified] : Ruby has DJD In both knees. She is minimally symptomatic at this time and will hold off on cortisone until prior to her trip to [place] for the winter  she will increase her activities as tolerated. she understands that she will ultimately require knee replacement in the future. all questions were answered. She will call if any issues arise

## 2024-09-25 NOTE — DISCUSSION/SUMMARY
[de-identified] : Ruby has DJD In both knees. She is minimally symptomatic at this time and will hold off on cortisone until prior to her trip to [place] for the winter  she will increase her activities as tolerated. she understands that she will ultimately require knee replacement in the future. all questions were answered. She will call if any issues arise

## 2024-09-25 NOTE — PHYSICAL EXAM
[de-identified] : The patient is a well developed, well nourished female in no apparent distress. She is alert and oriented X 3 with a pleasant mood and appropriate affect.     On physical examination of the right knee, her ROM is 0-120 degrees. The patient walks with a normal gait and stands in neutral alignment. There is trace effusion. No warmth or erythema is noted. The patella is non tender to palpation medially or laterally. There is no crepitus noted. The apprehension and grind tests are negative. The extensor mechanism is intact. There is lateral joint line tenderness. The Dennise sign is absent. The Lachman and pivot shift tests are negative. There is no varus or valgus laxity at 0 or 30 degrees. No posterolateral or anteromedial laxity is noted. No masses are palpable. No other soft tissue or bony tenderness is noted. Quadriceps weakness is noted. Neurovascular function is intact.

## 2024-09-25 NOTE — HISTORY OF PRESENT ILLNESS
[de-identified] : Ruby returns today for evaluation of both knees. She has longstanding DJD and has managed well with cortisone injections. She is heading to [place] in December and would like to discuss the timing of her next injections. She saw Dr Powell at [facility] and was told she is a candidate for knee replacement. She has little pain at this time. She denies any locking or buckling

## 2024-09-25 NOTE — HISTORY OF PRESENT ILLNESS
[de-identified] : Ruby returns today for evaluation of both knees. She has longstanding DJD and has managed well with cortisone injections. She is heading to [place] in December and would like to discuss the timing of her next injections. She saw Dr Powell at [facility] and was told she is a candidate for knee replacement. She has little pain at this time. She denies any locking or buckling

## 2024-12-03 ENCOUNTER — APPOINTMENT (OUTPATIENT)
Dept: ORTHOPEDIC SURGERY | Facility: CLINIC | Age: 79
End: 2024-12-03
Payer: MEDICARE

## 2024-12-03 ENCOUNTER — NON-APPOINTMENT (OUTPATIENT)
Age: 79
End: 2024-12-03

## 2024-12-03 VITALS
WEIGHT: 170 LBS | HEART RATE: 95 BPM | BODY MASS INDEX: 27.32 KG/M2 | DIASTOLIC BLOOD PRESSURE: 59 MMHG | TEMPERATURE: 98.3 F | SYSTOLIC BLOOD PRESSURE: 133 MMHG | HEIGHT: 66 IN | OXYGEN SATURATION: 98 %

## 2024-12-03 PROCEDURE — 20610 DRAIN/INJ JOINT/BURSA W/O US: CPT | Mod: RT

## 2024-12-12 ENCOUNTER — APPOINTMENT (OUTPATIENT)
Dept: ORTHOPEDIC SURGERY | Facility: CLINIC | Age: 79
End: 2024-12-12
Payer: MEDICARE

## 2024-12-12 VITALS
WEIGHT: 170 LBS | BODY MASS INDEX: 27.32 KG/M2 | HEART RATE: 86 BPM | TEMPERATURE: 98.1 F | SYSTOLIC BLOOD PRESSURE: 131 MMHG | DIASTOLIC BLOOD PRESSURE: 83 MMHG | OXYGEN SATURATION: 98 % | HEIGHT: 66 IN

## 2024-12-12 DIAGNOSIS — M17.11 UNILATERAL PRIMARY OSTEOARTHRITIS, RIGHT KNEE: ICD-10-CM

## 2024-12-12 PROCEDURE — 20611 DRAIN/INJ JOINT/BURSA W/US: CPT | Mod: RT

## 2024-12-12 PROCEDURE — 99213 OFFICE O/P EST LOW 20 MIN: CPT | Mod: 25

## 2025-04-22 ENCOUNTER — APPOINTMENT (OUTPATIENT)
Dept: ORTHOPEDIC SURGERY | Facility: CLINIC | Age: 80
End: 2025-04-22
Payer: MEDICARE

## 2025-04-22 ENCOUNTER — NON-APPOINTMENT (OUTPATIENT)
Age: 80
End: 2025-04-22

## 2025-04-22 VITALS
BODY MASS INDEX: 27.32 KG/M2 | HEIGHT: 66 IN | SYSTOLIC BLOOD PRESSURE: 129 MMHG | HEART RATE: 98 BPM | OXYGEN SATURATION: 98 % | DIASTOLIC BLOOD PRESSURE: 76 MMHG | WEIGHT: 170 LBS

## 2025-04-22 DIAGNOSIS — M17.12 UNILATERAL PRIMARY OSTEOARTHRITIS, LEFT KNEE: ICD-10-CM

## 2025-04-22 DIAGNOSIS — M17.11 UNILATERAL PRIMARY OSTEOARTHRITIS, RIGHT KNEE: ICD-10-CM

## 2025-04-22 PROCEDURE — 99213 OFFICE O/P EST LOW 20 MIN: CPT

## 2025-05-22 ENCOUNTER — APPOINTMENT (OUTPATIENT)
Dept: ORTHOPEDIC SURGERY | Facility: CLINIC | Age: 80
End: 2025-05-22

## 2025-05-22 VITALS
OXYGEN SATURATION: 97 % | SYSTOLIC BLOOD PRESSURE: 105 MMHG | HEIGHT: 66 IN | HEART RATE: 87 BPM | WEIGHT: 170 LBS | BODY MASS INDEX: 27.32 KG/M2 | DIASTOLIC BLOOD PRESSURE: 70 MMHG | TEMPERATURE: 97.8 F

## 2025-05-22 DIAGNOSIS — M17.12 UNILATERAL PRIMARY OSTEOARTHRITIS, LEFT KNEE: ICD-10-CM

## 2025-05-22 PROCEDURE — 20610 DRAIN/INJ JOINT/BURSA W/O US: CPT | Mod: LT

## 2025-05-29 ENCOUNTER — APPOINTMENT (OUTPATIENT)
Dept: ORTHOPEDIC SURGERY | Facility: CLINIC | Age: 80
End: 2025-05-29

## 2025-05-29 DIAGNOSIS — M17.11 UNILATERAL PRIMARY OSTEOARTHRITIS, RIGHT KNEE: ICD-10-CM

## 2025-08-18 ENCOUNTER — RX RENEWAL (OUTPATIENT)
Age: 80
End: 2025-08-18

## 2025-09-02 ENCOUNTER — APPOINTMENT (OUTPATIENT)
Dept: ORTHOPEDIC SURGERY | Facility: CLINIC | Age: 80
End: 2025-09-02
Payer: MEDICARE

## 2025-09-02 VITALS
OXYGEN SATURATION: 97 % | BODY MASS INDEX: 27.32 KG/M2 | HEART RATE: 88 BPM | SYSTOLIC BLOOD PRESSURE: 105 MMHG | HEIGHT: 66 IN | DIASTOLIC BLOOD PRESSURE: 69 MMHG | TEMPERATURE: 97.6 F | WEIGHT: 170 LBS

## 2025-09-02 DIAGNOSIS — M17.11 UNILATERAL PRIMARY OSTEOARTHRITIS, RIGHT KNEE: ICD-10-CM

## 2025-09-02 DIAGNOSIS — M17.12 UNILATERAL PRIMARY OSTEOARTHRITIS, LEFT KNEE: ICD-10-CM

## 2025-09-02 PROCEDURE — 99213 OFFICE O/P EST LOW 20 MIN: CPT
